# Patient Record
Sex: MALE | Race: AMERICAN INDIAN OR ALASKA NATIVE
[De-identification: names, ages, dates, MRNs, and addresses within clinical notes are randomized per-mention and may not be internally consistent; named-entity substitution may affect disease eponyms.]

---

## 2017-06-19 ENCOUNTER — HOSPITAL ENCOUNTER (EMERGENCY)
Dept: HOSPITAL 31 - C.ER | Age: 67
Discharge: HOME | End: 2017-06-19
Payer: MEDICARE

## 2017-06-19 VITALS — OXYGEN SATURATION: 98 % | RESPIRATION RATE: 16 BRPM | TEMPERATURE: 98.4 F

## 2017-06-19 VITALS — SYSTOLIC BLOOD PRESSURE: 170 MMHG | HEART RATE: 60 BPM | DIASTOLIC BLOOD PRESSURE: 102 MMHG

## 2017-06-19 VITALS — BODY MASS INDEX: 23.7 KG/M2

## 2017-06-19 DIAGNOSIS — H57.8: ICD-10-CM

## 2017-06-19 DIAGNOSIS — R42: Primary | ICD-10-CM

## 2017-06-19 NOTE — C.PDOC
Time Seen by Provider: 06/19/17 12:24


Chief Complaint (Nursing): Dizziness/Lightheaded





Past Medical History


Vital Signs: 





 Last Vital Signs











Temp  98.4 F   06/19/17 11:58


 


Pulse  54 L  06/19/17 11:58


 


Resp  16   06/19/17 11:58


 


BP  198/120 H  06/19/17 11:58


 


Pulse Ox  98   06/19/17 11:58














- Medical History


PMH: HTN


Family History: States: Unknown Family Hx





- Social History


Hx Tobacco Use: Yes


Hx Alcohol Use: No


Hx Substance Use: No





- Immunization History


Hx Tetanus Toxoid Vaccination: No


Hx Influenza Vaccination: No


Hx Pneumococcal Vaccination: No





ED Course And Treatment


O2 Sat by Pulse Oximetry: 98

## 2017-06-19 NOTE — C.PDOC
History Of Present Illness





66 y/o male pmhx HTN presents to the ED with complaints of eye discharge 4 days 

ago with lightheadedness x4 days (worse the first 2 days, much improved the 

last 2). Pt denies fever, headache, chest pain, palpitations, back pain, SOB, 

numbness, weakness or any other complaints. Denies fall/trauma. Pt states he 

gets similar symptoms when "has an infection" of some type and gets better with 

antibiotics. 


Time Seen by Provider: 17 12:24


Chief Complaint (Nursing): Dizziness/Lightheaded


History Per: Patient


History/Exam Limitations: no limitations


Onset/Duration Of Symptoms: Days


Current Symptoms Are (Timing): Better


Seizure Or Post-ictal Symptoms: None


Fall Associated With With Symptoms: No


Severity: Mild


Recent travel outside of the [place]: No





- Symptoms Of CVA


Recent Head Trauma: No





Past Medical History


Reviewed: Historical Data, Nursing Documentation, Vital Signs


Vital Signs: 


 Last Vital Signs











Temp  98.4 F   17 11:58


 


Pulse  54 L  17 11:58


 


Resp  16   17 11:58


 


BP  198/120 H  17 11:58


 


Pulse Ox  98   17 12:37














- Medical History


PMH: HTN


Family History: States: Unknown Family Hx





- Social History


Hx Tobacco Use: Yes


Hx Alcohol Use: No


Hx Substance Use: No





- Immunization History


Hx Tetanus Toxoid Vaccination: No


Hx Influenza Vaccination: No


Hx Pneumococcal Vaccination: No





Review Of Systems


Constitutional: Negative for: Fever, Chills


Eyes: Positive for: Other (eye discharge)


Cardiovascular: Positive for: Light Headedness.  Negative for: Chest Pain, 

Palpitations


Respiratory: Negative for: Shortness of Breath


Musculoskeletal: Negative for: Back Pain


Neurological: Negative for: Weakness, Numbness, Headache





Physical Exam





- Physical Exam


Additional Physical Exam Comments: 





Constitutional: No acute distress.


Head: Normocephalic. Atraumatic.


Eyes: PERRL. No eye discharge or redness.


ENT: Moist mucous membranes.


Neck: Supple.


Cardiovascular: Regular rate. No murmur. Radial pulse 2+ bilaterally.


Chest: No tenderness.


Respiratory: Clear to auscultation bilaterally.


GI: Soft. Nontender. Nondistended.


Back: No CVA tenderness.


Musculoskeletal: No tenderness or swelling of extremities.


Skin: No rash.


Neurologic: Alert, no focal deficit. Motor 5/5 x4. Sensation to light touch 

equal bilaterally. No facial droop. 








ED Course And Treatment


ECG: Interpreted By Me, Viewed By Me


ECG Rhythm: Sinus Rhythm


Interpretation Of ECG: Mild ST elevations, not concave. No change from previous 

3 EKGs on record.


Rate From EC (BPM)


O2 Sat by Pulse Oximetry: 98 (room air)


Pulse Ox Interpretation: Normal





Medical Decision Making


Medical Decision Making: 


Will treat with antibiotics, f/u PMD. Counseled patient on HTN management. He 

states his blood pressure has been this elevated for 20 years and he is on 

Lopressor. Instructed to return for any chest pain, vomiting, diaphoresis, back 

pain.





Disposition





- Disposition


Disposition: HOME/ ROUTINE


Disposition Time: 12:39


Condition: STABLE


Prescriptions: 


Amoxicillin/Clavulanate [Augmentin 875 MG-125 MG] 1 tab PO BID #20 tab


Instructions:  Lightheadedness (ED)





- Clinical Impression


Clinical Impression: 


 Lightheadedness, Eye discharge








- Scribe Statement


The provider has reviewed the documentation as recorded by the Mathew Ritter


Provider Attestation: 








All medical record entries made by the Mathew were at my direction and 

personally dictated by me. I have reviewed the chart and agree that the record 

accurately reflects my personal performance of the history, physical exam, 

medical decision making, and the department course for this patient. I have 

also personally directed, reviewed, and agree with the discharge instructions 

and disposition.

## 2019-03-01 ENCOUNTER — HOSPITAL ENCOUNTER (EMERGENCY)
Dept: HOSPITAL 31 - C.ER | Age: 69
Discharge: LEFT BEFORE DISCHARGE | End: 2019-03-01
Payer: MEDICARE

## 2019-03-01 VITALS — OXYGEN SATURATION: 100 %

## 2019-03-01 VITALS — DIASTOLIC BLOOD PRESSURE: 108 MMHG | SYSTOLIC BLOOD PRESSURE: 204 MMHG | HEART RATE: 54 BPM

## 2019-03-01 VITALS — BODY MASS INDEX: 23.7 KG/M2

## 2019-03-01 VITALS — RESPIRATION RATE: 16 BRPM

## 2019-03-01 VITALS — TEMPERATURE: 98.7 F

## 2019-03-01 DIAGNOSIS — Z72.0: ICD-10-CM

## 2019-03-01 DIAGNOSIS — M79.604: Primary | ICD-10-CM

## 2019-03-01 DIAGNOSIS — I10: ICD-10-CM

## 2019-03-01 LAB
ALBUMIN SERPL-MCNC: 4.9 G/DL (ref 3.5–5)
ALBUMIN/GLOB SERPL: 1.5 {RATIO} (ref 1–2.1)
ALT SERPL-CCNC: 33 U/L (ref 21–72)
AST SERPL-CCNC: 36 U/L (ref 17–59)
BASOPHILS # BLD AUTO: 0.1 K/UL (ref 0–0.2)
BASOPHILS NFR BLD: 1.3 % (ref 0–2)
BUN SERPL-MCNC: 14 MG/DL (ref 9–20)
CALCIUM SERPL-MCNC: 9.7 MG/DL (ref 8.6–10.4)
EOSINOPHIL # BLD AUTO: 0.1 K/UL (ref 0–0.7)
EOSINOPHIL NFR BLD: 2.1 % (ref 0–4)
ERYTHROCYTE [DISTWIDTH] IN BLOOD BY AUTOMATED COUNT: 13.3 % (ref 11.5–14.5)
GFR NON-AFRICAN AMERICAN: > 60
HGB BLD-MCNC: 13.9 G/DL (ref 12–18)
LYMPHOCYTES # BLD AUTO: 0.8 K/UL (ref 1–4.3)
LYMPHOCYTES NFR BLD AUTO: 19.5 % (ref 20–40)
MCH RBC QN AUTO: 28 PG (ref 27–31)
MCHC RBC AUTO-ENTMCNC: 32.2 G/DL (ref 33–37)
MCV RBC AUTO: 86.9 FL (ref 80–94)
MONOCYTES # BLD: 0.6 K/UL (ref 0–0.8)
MONOCYTES NFR BLD: 15.8 % (ref 0–10)
NEUTROPHILS # BLD: 2.5 K/UL (ref 1.8–7)
NEUTROPHILS NFR BLD AUTO: 61.3 % (ref 50–75)
NRBC BLD AUTO-RTO: 0.1 % (ref 0–2)
PLATELET # BLD: 285 K/UL (ref 130–400)
PMV BLD AUTO: 8.4 FL (ref 7.2–11.7)
RBC # BLD AUTO: 4.97 MIL/UL (ref 4.4–5.9)
WBC # BLD AUTO: 4 K/UL (ref 4.8–10.8)

## 2019-03-01 NOTE — C.PDOC
History Of Present Illness


68 year old male presents to ED with complaint of right distal lateral leg pain.

Patient was at the gym 2 weeks ago doing bar exercises above his ankles and 

behind his ankles.  Patient states that it hurts when he walks. He states that 

the pain radiates to his right calf. He states he took Tylenol with no 

improvement. Patient denies numbness and weakness.








<Erika Gray - Last Filed: 03/01/19 18:44>


History Per: Patient


History/Exam Limitations: no limitations


Onset/Duration Of Symptoms: Other (2 weeks)


Current Symptoms Are (Timing): Still Present





<Erika Gray - Last Filed: 03/01/19 18:44>





<Eleanor Mendez - Last Filed: 03/01/19 19:49>


Time Seen by Provider: 03/01/19 13:30


Chief Complaint (Nursing): Lower Extremity Problem/Injury





Past Medical History


Reviewed: Historical Data, Nursing Documentation, Vital Signs


Vital Signs: 





                                Last Vital Signs











Temp  98.7 F   03/01/19 13:17


 


Pulse  61   03/01/19 13:45


 


Resp  18   03/01/19 13:17


 


BP  169/99 H  03/01/19 13:45


 


Pulse Ox  100   03/01/19 13:17














- Medical History


PMH: HTN


Surgical History: No Surg Hx


Family History: States: Unknown Family Hx





- Social History


Hx Tobacco Use: Yes


Hx Alcohol Use: No


Hx Substance Use: No





- Immunization History


Hx Tetanus Toxoid Vaccination: No


Hx Influenza Vaccination: No


Hx Pneumococcal Vaccination: No





<Erika Gray - Last Filed: 03/01/19 18:44>


Vital Signs: 





                                Last Vital Signs











Temp  98.7 F   03/01/19 13:17


 


Pulse  54 L  03/01/19 18:22


 


Resp  16   03/01/19 18:22


 


BP  204/108 H  03/01/19 18:22


 


Pulse Ox  100   03/01/19 18:45














<Eleanor Mendez - Last Filed: 03/01/19 19:49>





Review Of Systems


Constitutional: Negative for: Fever, Chills, Weakness


Cardiovascular: Negative for: Chest Pain


Respiratory: Negative for: Shortness of Breath


Musculoskeletal: Positive for: Leg Pain (right distal lateral leg pain and right

calf pain ).  Negative for: Back Pain


Skin: Negative for: Rash


Neurological: Negative for: Weakness, Numbness, Dizziness





<IsaacTeresaErika - Last Filed: 03/01/19 18:44>





Physical Exam





- Physical Exam


Appears: Well, No Acute Distress


Skin: Normal Color, Warm, Dry


Head: Atraumatic, Normacephalic


Neck: Normal ROM, Supple


Chest: Symmetrical, No Deformity


Respiratory: No Accessory Muscle Use


Extremity: Tenderness (right distal lower leg), Calf Tenderness (right 

posterior)


Pulses: Left Femoral: Normal, Right Femoral: Normal, Left Dorsalis Pedis: 

Normal, Right Dorsalis Pedis: Normal


Neurological/Psych: Oriented x3, Normal Speech, Normal Cognition, Normal 

Sensation





<IsaacErika - Last Filed: 03/01/19 18:44>





ED Course And Treatment





- Laboratory Results


Result Diagrams: 


                                 03/01/19 16:42





                                 03/01/19 16:42


O2 Sat by Pulse Oximetry: 100 (in RA)





- Other Rad


  ** Right tibia fibula X-ray


X-Ray: Interpreted by Me, Viewed By Me, Read By Radiologist


Interpretation: IMPRESSION:  No fracture or dislocation identified.





  ** ankle


X-Ray: Viewed By Me, Read By Radiologist


Interpretation: BONES:  Previously described irregularity of the distal fibula 

medial cortex represents overlap of periostitis/ossification of the syndesmosis 

from the lateral cortex of the distal tibia.  No acute fracture identified.





<Erika Gray - Last Filed: 03/01/19 18:44>





- Laboratory Results


Result Diagrams: 


                                 03/01/19 16:42





                                 03/01/19 16:42


Lab Results: 





                                        











Total Bilirubin  0.7 mg/dL (0.2-1.3)   03/01/19  16:42    


 


AST  36 U/L (17-59)   03/01/19  16:42    


 


ALT  33 U/L (21-72)   03/01/19  16:42    


 


Alkaline Phosphatase  115 U/L ()   03/01/19  16:42    


 


Total Protein  8.0 g/dL (6.3-8.3)   03/01/19  16:42    


 


Albumin  4.9 g/dL (3.5-5.0)   03/01/19  16:42    


 


Globulin  3.2 gm/dL (2.2-3.9)   03/01/19  16:42    


 


Albumin/Globulin Ratio  1.5  (1.0-2.1)   03/01/19  16:42    














<Eleanor Mendez - Last Filed: 03/01/19 19:49>





Medical Decision Making


Medical Decision Making: 


Impression: 68 year old male complaining of distal lateral lower leg pain





Plan:


Patient given Motrin PO. 


Right ankle X-ray and Right tibia fibula x-ray ordered for patient.


Venous duplex scan of the lower extremities ordered for patient.





pt about to be discharged, now with bp in 230s/110. pt reports hx htn, takes lo

pressor and minoxidil as well as 2 other medications in evening time, sts last 

taken thur night. denies cp, sob, headache, dizziness.  





1830  pt has normal labs, given 25 mg po hydralazine (no iv form available) due 

to bradycardia.  pt now with bp 204/108.  pt declines to stay until pressure 

decreased.  I explained to patient the risk of stroke, mi, permanent disability,

death and he chooses to leave against medical advice and understands and accepts

all the risks and consequences. JAMIR Baker present for conversation. pt made 

aware he may return to ED at any time,  





<Erika Gray - Last Filed: 03/01/19 18:44>





Disposition


Counseled Patient/Family Regarding: Studies Performed, Diagnosis, Need For 

Followup, Rx Given





- Disposition


Disposition Time: 18:45





<Erika Gray - Last Filed: 03/01/19 18:44>





<Eleanor Mendez - Last Filed: 03/01/19 19:49>





- Disposition


Referrals: 


Radames Mars III, MD [Staff Provider] - 


[facility] [Outside]


Disposition: AGAINST MEDICAL ADVICE


Condition: SERIOUS


Additional Instructions: 


Wear ace bandage to right lower leg while awake and take Tylenol for pain. 

Follow up with Dr Mars from orthopedics if pain persists. 


Take all blood pressure medications as prescribed. Follow up [facility] 

i or with your doctor as soon as possible. 


Return to ER at any time for further evaluation and treatment of blood pressure 

or for mnay other concerns. 


Prescriptions: 


Acetaminophen [Tylenol 325mg tab] 650 mg PO Q6 #30 tab


Instructions:  Muscle and Bone Pain (DC), High Blood Pressure Emergencies


Forms:  [employer] (English), General Discharge Instructions





- Clinical Impression


Clinical Impression: 


 Right leg pain, Hypertension, Left against medical advice








- PA / NP / Resident Statement


MD/DO has reviewed & agrees with the documentation as recorded. (Padmini Cheng)





- Scribe Statement


The provider has reviewed the documentation as recorded by the Scribe (Padmini Cheng)








All medical record entries made by the Scribe were at my direction and 

personally dictated by me. I have reviewed the chart and agree that the record 

accurately reflects my personal performance of the history, physical exam, 

medical decision making, and the department course for this patient. I have also

personally directed, reviewed, and agree with the discharge instructions and 

disposition.





<Erika Gray - Last Filed: 03/01/19 18:44>

## 2019-03-01 NOTE — RAD
Date of service: 



03/01/2019



PROCEDURE:  Right Ankle Radiographs.



HISTORY:

 distal leg pain 



COMPARISON:

Right tib-fib x-rays performed earlier same day



FINDINGS:



BONES:

Previously described irregularity of the distal fibula medial cortex 

represents overlap of periostitis/ossification of the syndesmosis 

from the lateral cortex of the distal tibia.  No acute fracture 

identified. 



JOINTS:

No dislocation seen.  Ankle mortise maintained. Talar dome intact



SOFT TISSUES:

Unremarkable 



OTHER FINDINGS:

None.



IMPRESSION:

No fracture or dislocation identified.



Additional findings as above.

## 2019-03-01 NOTE — C.PDOC
Time Seen by Provider: 03/01/19 13:30


Chief Complaint (Nursing): Lower Extremity Problem/Injury





Past Medical History


Vital Signs: 





                                Last Vital Signs











Temp  98.7 F   03/01/19 13:17


 


Pulse  61   03/01/19 13:45


 


Resp  18   03/01/19 13:17


 


BP  169/99 H  03/01/19 13:45


 


Pulse Ox  100   03/01/19 13:17














- Medical History


PMH: HTN


Family History: States: Unknown Family Hx





- Social History


Hx Tobacco Use: Yes


Hx Alcohol Use: No


Hx Substance Use: No





- Immunization History


Hx Tetanus Toxoid Vaccination: No


Hx Influenza Vaccination: No


Hx Pneumococcal Vaccination: No





ED Course And Treatment


O2 Sat by Pulse Oximetry: 100





Disposition





- Disposition

## 2019-03-02 NOTE — VASCLAB
Date of service: 



03/01/2019



PROCEDURE:  Right Lower Extremity Venous Duplex Exam. 



HISTORY:

lateral calf pain 



PRIORS:

03/12/2015 



TECHNIQUE:

Right common femoral, femoral, popliteal and posterior tibial, 

peroneal and great saphenous veins were evaluated. Flow was assessed 

with color Doppler, compressibility, assessment of phasic flow and 

augmentation response.



Report prepared by   BRENDA Owens, RVT



FINDINGS:



RIGHT:

1. Common Femoral Vein: 



1.1. Compressibility - Fully compressible: Thrombus -  None: Flow - 

Phasic: Augmentation -Normal: Reflux - None.



2. Femoral Vein: 



2.1. Compressibility - Fully compressible: Thrombus -  None: Flow - 

Phasic: Augmentation -Normal: Reflux - None.



3. Popliteal Vein: 



3.1. Compressibility - Fully compressible: Thrombus -  None: Flow - 

Phasic: Augmentation -Normal: Reflux - None.



4. Posterior Tibial Vein: 



4.1. Compressibility - Fully compressible: Thrombus -  None: Flow - 

Phasic: Augmentation -Normal: Reflux - None.



5. Peroneal Vein: 



5.1. Compressibility - Fully compressible: Thrombus - None:  Flow - 

Phasic: Augmentation -Normal: Reflux - None.



6. Great Saphenous Vein:

6.1.  Compressibility - Fully compressible: Thrombus -None: Flow - 

Phasic: Augmentation - Normal: Reflux - None.





OTHER FINDINGS:  



IMPRESSION:

No evidence of deep or superficial vein thrombosis of the right lower 

extremity with excellent venous flow. Normal valve function noted of 

the right side.     



Normal venous flow noted in the left common femoral vein.

## 2019-03-04 NOTE — CARD
--------------- APPROVED REPORT --------------





Date of service: 03/01/2019



EKG Measurement

Heart [id number]

RI [id number]

[id number]

[id number]

[id number]



<Conclusion>

Sinus bradycardia with occasional premature ventricular complexes

ST elevation, consider early repolarization, pericarditis, or injury

Abnormal ECG